# Patient Record
Sex: FEMALE | Race: WHITE | NOT HISPANIC OR LATINO | Employment: FULL TIME | URBAN - METROPOLITAN AREA
[De-identification: names, ages, dates, MRNs, and addresses within clinical notes are randomized per-mention and may not be internally consistent; named-entity substitution may affect disease eponyms.]

---

## 2023-05-19 ENCOUNTER — APPOINTMENT (OUTPATIENT)
Dept: PRIMARY CARE | Facility: CLINIC | Age: 38
End: 2023-05-19
Payer: COMMERCIAL

## 2023-05-23 ENCOUNTER — OFFICE VISIT (OUTPATIENT)
Dept: PRIMARY CARE | Facility: CLINIC | Age: 38
End: 2023-05-23
Payer: COMMERCIAL

## 2023-05-23 VITALS
HEART RATE: 72 BPM | RESPIRATION RATE: 16 BRPM | DIASTOLIC BLOOD PRESSURE: 72 MMHG | WEIGHT: 190 LBS | BODY MASS INDEX: 31.62 KG/M2 | SYSTOLIC BLOOD PRESSURE: 116 MMHG

## 2023-05-23 DIAGNOSIS — Z00.00 ANNUAL PHYSICAL EXAM: Primary | ICD-10-CM

## 2023-05-23 PROBLEM — Z30.02 ENCOUNTER FOR COUNSELING AND INSTRUCTION IN NATURAL FAMILY PLANNING TO AVOID PREGNANCY: Status: ACTIVE | Noted: 2023-05-23

## 2023-05-23 PROBLEM — L20.9 ATOPIC DERMATITIS: Status: ACTIVE | Noted: 2023-05-23

## 2023-05-23 PROBLEM — H10.10 ALLERGIC CONJUNCTIVITIS: Status: ACTIVE | Noted: 2023-05-23

## 2023-05-23 PROBLEM — H04.123 DRY EYES, BILATERAL: Status: ACTIVE | Noted: 2023-05-23

## 2023-05-23 PROBLEM — E25.0: Status: ACTIVE | Noted: 2023-05-23

## 2023-05-23 PROBLEM — J45.909 ASTHMA (HHS-HCC): Status: ACTIVE | Noted: 2023-05-23

## 2023-05-23 PROBLEM — H52.10 MYOPIA: Status: ACTIVE | Noted: 2023-05-23

## 2023-05-23 PROCEDURE — 99395 PREV VISIT EST AGE 18-39: CPT | Performed by: INTERNAL MEDICINE

## 2023-05-23 RX ORDER — DROSPIRENONE AND ETHINYL ESTRADIOL 0.03MG-3MG
1 KIT ORAL DAILY
COMMUNITY
Start: 2023-02-09 | End: 2023-05-23 | Stop reason: ALTCHOICE

## 2023-05-23 RX ORDER — CETIRIZINE HYDROCHLORIDE 10 MG/1
10 TABLET ORAL DAILY
COMMUNITY

## 2023-05-23 RX ORDER — AZELASTINE 1 MG/ML
1 SPRAY, METERED NASAL 2 TIMES DAILY
COMMUNITY

## 2023-05-23 RX ORDER — OLOPATADINE HYDROCHLORIDE 1 MG/ML
1 SOLUTION/ DROPS OPHTHALMIC 2 TIMES DAILY
COMMUNITY

## 2023-05-23 RX ORDER — CHOLECALCIFEROL (VITAMIN D3) 25 MCG
25 TABLET ORAL DAILY
COMMUNITY
Start: 2021-02-03

## 2023-05-23 RX ORDER — NORETHINDRONE 0.35 MG/1
1 TABLET ORAL DAILY
COMMUNITY

## 2023-06-10 NOTE — PROGRESS NOTES
"     Gisel Hooker is a 38 y.o. female who presents for No chief complaint on file..     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     #Postpartum  -Had baby in 21,   -Breastfeeding \"a little still\"   -On OCPs (progesterone only)      #Eczema  -topical clobetasol cream PRN   -Last saw derm 10/2020  -Better than previously, intermittent mild symptoms      #Seasonal Allergies   -Taking Zyrtec, nasal spray (azelastine), and eye drops   -Nasal spray      #HM  -PAP/cytology with HPV cotesting  WNL/negative  -Labs: normal 2022; no need to repeat; no lipid panel while breastfeeding   -On Vitamin D      #Social History   -Never smoking, rare alcohol use  -Wears glasses, last seen 2021        Review of Systems           Objective   /72 (BP Location: Right arm, Patient Position: Sitting, BP Cuff Size: Adult)   Pulse 72   Resp 16   Wt 86.2 kg (190 lb)   BMI 31.62 kg/m²      Physical Exam  Constitutional:       Appearance: Normal appearance.   HENT:      Head: Normocephalic and atraumatic.      Right Ear: External ear normal.      Left Ear: External ear normal.      Nose: No rhinorrhea.      Mouth/Throat:      Mouth: Mucous membranes are moist.      Pharynx: Oropharynx is clear. No oropharyngeal exudate or posterior oropharyngeal erythema.   Eyes:      General: No scleral icterus.     Conjunctiva/sclera: Conjunctivae normal.      Pupils: Pupils are equal, round, and reactive to light.   Cardiovascular:      Rate and Rhythm: Normal rate and regular rhythm.      Pulses: Normal pulses.      Heart sounds: Normal heart sounds. No murmur heard.     No friction rub. No gallop.   Pulmonary:      Effort: Pulmonary effort is normal. No respiratory distress.      Breath sounds: No stridor. No wheezing, rhonchi or rales.   Abdominal:      General: Abdomen is flat. Bowel sounds are normal. There is no distension.      Palpations: Abdomen is soft.      " Tenderness: There is no abdominal tenderness. There is no guarding or rebound.   Musculoskeletal:         General: No swelling or deformity. Normal range of motion.      Cervical back: Normal range of motion and neck supple. No rigidity.      Right lower leg: No edema.      Left lower leg: No edema.   Lymphadenopathy:      Cervical: No cervical adenopathy.   Skin:     General: Skin is warm and dry.      Capillary Refill: Capillary refill takes less than 2 seconds.      Findings: No lesion or rash.   Neurological:      General: No focal deficit present.      Mental Status: She is alert and oriented to person, place, and time. Mental status is at baseline.      Cranial Nerves: No cranial nerve deficit.      Motor: No weakness.      Gait: Gait normal.   Psychiatric:         Mood and Affect: Mood normal.         Behavior: Behavior normal.                  Assessment/Plan   Diagnoses and all orders for this visit:  Annual physical exam  -No medication changes   -No need for labs; will get lipid panel after breastfeeding completed   -PAP due 2026     Discussed with Dr. Gurwinder Beck MD   Internal Medicine - Pediatrics PGY 4

## 2023-06-10 NOTE — PROGRESS NOTES
"     Gisel Hooker is a 38 y.o. female who presents for No chief complaint on file..     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     #Postpartum  -Had baby in 21,   -Breastfeeding \"a little still\"   -On OCPs (progesterone only)      #Eczema  -topical clobetasol cream PRN   -Last saw derm 10/2020  -Better than previously, intermittent mild symptoms      #Seasonal Allergies   -Taking Zyrtec, nasal spray (azelastine), and eye drops   -Nasal spray      #HM  -PAP/cytology with HPV cotesting  WNL/negative  -Labs: normal 2022; no need to repeat; no lipid panel while breastfeeding   -On Vitamin D      #Social History   -Never smoking, rare alcohol use  -Wears glasses, last seen 2021        Review of Systems           Objective   /72 (BP Location: Right arm, Patient Position: Sitting, BP Cuff Size: Adult)   Pulse 72   Resp 16   Wt 86.2 kg (190 lb)   BMI 31.62 kg/m²      Physical Exam  Constitutional:       Appearance: Normal appearance.   HENT:      Head: Normocephalic and atraumatic.      Right Ear: External ear normal.      Left Ear: External ear normal.      Nose: No rhinorrhea.      Mouth/Throat:      Mouth: Mucous membranes are moist.      Pharynx: Oropharynx is clear. No oropharyngeal exudate or posterior oropharyngeal erythema.   Eyes:      General: No scleral icterus.     Conjunctiva/sclera: Conjunctivae normal.      Pupils: Pupils are equal, round, and reactive to light.   Cardiovascular:      Rate and Rhythm: Normal rate and regular rhythm.      Pulses: Normal pulses.      Heart sounds: Normal heart sounds. No murmur heard.     No friction rub. No gallop.   Pulmonary:      Effort: Pulmonary effort is normal. No respiratory distress.      Breath sounds: No stridor. No wheezing, rhonchi or rales.   Abdominal:      General: Abdomen is flat. Bowel sounds are normal. There is no distension.      Palpations: Abdomen is soft.      " Tenderness: There is no abdominal tenderness. There is no guarding or rebound.   Musculoskeletal:         General: No swelling or deformity. Normal range of motion.      Cervical back: Normal range of motion and neck supple. No rigidity.      Right lower leg: No edema.      Left lower leg: No edema.   Lymphadenopathy:      Cervical: No cervical adenopathy.   Skin:     General: Skin is warm and dry.      Capillary Refill: Capillary refill takes less than 2 seconds.      Findings: No lesion or rash.   Neurological:      General: No focal deficit present.      Mental Status: She is alert and oriented to person, place, and time. Mental status is at baseline.      Cranial Nerves: No cranial nerve deficit.      Motor: No weakness.      Gait: Gait normal.   Psychiatric:         Mood and Affect: Mood normal.         Behavior: Behavior normal.                  Assessment/Plan   Diagnoses and all orders for this visit:  Annual physical exam  -No medication changes   -No need for labs; will get lipid panel after breastfeeding completed   -PAP due 2026     Discussed with Dr. Gurwinder Beck MD   Internal Medicine - Pediatrics PGY 4

## 2023-06-20 ENCOUNTER — LAB (OUTPATIENT)
Dept: LAB | Facility: LAB | Age: 38
End: 2023-06-20
Payer: COMMERCIAL

## 2023-06-20 DIAGNOSIS — J06.9 VIRAL UPPER RESPIRATORY INFECTION: Primary | ICD-10-CM

## 2023-06-20 DIAGNOSIS — J06.9 VIRAL UPPER RESPIRATORY INFECTION: ICD-10-CM

## 2023-06-20 PROCEDURE — 87635 SARS-COV-2 COVID-19 AMP PRB: CPT

## 2023-06-21 LAB — SARS-COV-2 RESULT: NOT DETECTED

## 2023-09-10 PROBLEM — D22.21 MELANOCYTIC NEVI OF RIGHT EAR AND EXTERNAL AURICULAR CANAL: Status: ACTIVE | Noted: 2023-06-23

## 2023-09-10 PROBLEM — N91.2 AMENORRHEA: Status: ACTIVE | Noted: 2023-09-10

## 2023-09-10 PROBLEM — D22.62 MELANOCYTIC NEVI OF LEFT UPPER LIMB, INCLUDING SHOULDER: Status: ACTIVE | Noted: 2023-06-23

## 2023-09-10 PROBLEM — H53.9 VISUAL DISTURBANCE: Status: ACTIVE | Noted: 2023-09-10

## 2023-09-10 PROBLEM — N91.5 OLIGOMENORRHEA: Status: ACTIVE | Noted: 2023-09-10

## 2023-09-10 PROBLEM — D22.4 MELANOCYTIC NEVI OF SCALP AND NECK: Status: ACTIVE | Noted: 2023-06-23

## 2023-09-10 PROBLEM — H52.209 ASTIGMATISM: Status: ACTIVE | Noted: 2023-09-10

## 2023-09-10 PROBLEM — I78.1 NEVUS, NON-NEOPLASTIC: Status: ACTIVE | Noted: 2023-06-23

## 2023-09-10 PROBLEM — D22.70 MELANOCYTIC NEVI OF UNSPECIFIED LOWER LIMB, INCLUDING HIP: Status: ACTIVE | Noted: 2023-06-23

## 2023-09-10 PROBLEM — D22.60 MELANOCYTIC NEVI OF UNSPECIFIED UPPER LIMB, INCLUDING SHOULDER: Status: ACTIVE | Noted: 2023-06-23

## 2023-09-10 PROBLEM — L85.8 OTHER SPECIFIED EPIDERMAL THICKENING: Status: ACTIVE | Noted: 2023-06-23

## 2023-09-10 PROBLEM — H43.393 VITREOUS FLOATERS OF BOTH EYES: Status: ACTIVE | Noted: 2023-09-10

## 2023-09-10 PROBLEM — D22.39 MELANOCYTIC NEVI OF OTHER PARTS OF FACE: Status: ACTIVE | Noted: 2023-06-23

## 2023-09-10 PROBLEM — D22.5 MELANOCYTIC NEVI OF TRUNK: Status: ACTIVE | Noted: 2023-06-23

## 2023-09-10 PROBLEM — D22.30 MELANOCYTIC NEVI OF UNSPECIFIED PART OF FACE: Status: ACTIVE | Noted: 2023-06-23

## 2023-09-10 PROBLEM — N97.0 ANOVULATION: Status: ACTIVE | Noted: 2023-09-10

## 2023-09-10 PROBLEM — L81.4 OTHER MELANIN HYPERPIGMENTATION: Status: ACTIVE | Noted: 2023-06-23

## 2023-09-10 PROBLEM — D22.61 MELANOCYTIC NEVI OF RIGHT UPPER LIMB, INCLUDING SHOULDER: Status: ACTIVE | Noted: 2023-06-23

## 2023-09-10 PROBLEM — D22.72 MELANOCYTIC NEVI OF LEFT LOWER LIMB, INCLUDING HIP: Status: ACTIVE | Noted: 2023-06-23

## 2023-09-10 PROBLEM — D22.71 MELANOCYTIC NEVI OF RIGHT LOWER LIMB, INCLUDING HIP: Status: ACTIVE | Noted: 2023-06-23

## 2023-09-10 RX ORDER — CLOBETASOL PROPIONATE 0.5 MG/G
1 CREAM TOPICAL
COMMUNITY
Start: 2016-05-17

## 2023-09-10 RX ORDER — AMMONIUM LACTATE 5 %
1 LOTION (GRAM) TOPICAL
COMMUNITY
Start: 2019-10-18

## 2023-09-10 RX ORDER — DROSPIRENONE AND ETHINYL ESTRADIOL 0.03MG-3MG
1 KIT ORAL DAILY
COMMUNITY
End: 2024-03-21 | Stop reason: SDUPTHER

## 2023-10-11 ASSESSMENT — EXTERNAL EXAM - RIGHT EYE: OD_EXAM: NORMAL

## 2023-10-11 ASSESSMENT — SLIT LAMP EXAM - LIDS
COMMENTS: GOOD POSITION
COMMENTS: GOOD POSITION

## 2023-10-11 ASSESSMENT — EXTERNAL EXAM - LEFT EYE: OS_EXAM: NORMAL

## 2023-10-11 ASSESSMENT — CUP TO DISC RATIO
OD_RATIO: 0.2
OS_RATIO: 0.15

## 2023-10-11 NOTE — PROGRESS NOTES
EzraikM34.10  TscdqunjvqvC42.209  -New Rx given for glasses (but optional to fill since wears CL`s primarily)  -New Rx for CL`s given - no overnight wear and replace q2 weeks  -In 2020 tried trial of decreasing sph and increasing cyl and patient did not like this change as much. Tolerating current Rx well without difficulty with reading - will continue current Rx for now.  OD: -7.00 - 1.75 x 100  OS: -7.00 - 1.75 x 70  **11/29/23 update: AV Oasys for Astigmatism has been discontinued in patient's Rx. Will try Acuvue Marce for Astigmatism 8.6/14.5 in same Rx (monthly disposable lens) - will order trial lenses and can give final Rx if patient likes new lenses. If not, can consider Air Optix for Astigmatism or daily disposable toric.     Vitreous floaters of both eyesH43.393  -History of floaters. Relatively asymptomatic now. No retinal tear or detachment seen on exam. Retinal detachment symptoms discussed.     Dry eyes, fwhblckpfS08.123  -Works on computer all day. Advised to try blinking with blurring or use CL rewetting gtts. Can also add warm compresses as needed.   -History of spring time allergies, has tried zaditor in the past without much relief. Next time can consider using Azelastine --> patient to send me message if she wants a prescription sent.      Family history of macular hsoqwrpymuhdW40.518  -(+)FH AMD: PGF  -Normal macular exam today. Monitor annually.    No history of intraocular surgery/refractive surgery.   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 No further episodes. Monitor.

## 2023-10-13 ENCOUNTER — OFFICE VISIT (OUTPATIENT)
Dept: OPHTHALMOLOGY | Facility: CLINIC | Age: 38
End: 2023-10-13
Payer: COMMERCIAL

## 2023-10-13 DIAGNOSIS — H04.123 DRY EYES, BILATERAL: ICD-10-CM

## 2023-10-13 DIAGNOSIS — Z83.518 FAMILY HISTORY OF MACULAR DEGENERATION: ICD-10-CM

## 2023-10-13 DIAGNOSIS — H52.203 ASTIGMATISM OF BOTH EYES, UNSPECIFIED TYPE: ICD-10-CM

## 2023-10-13 DIAGNOSIS — H52.13 MYOPIA OF BOTH EYES: Primary | ICD-10-CM

## 2023-10-13 DIAGNOSIS — H43.393 VITREOUS FLOATERS OF BOTH EYES: ICD-10-CM

## 2023-10-13 PROCEDURE — 92015 DETERMINE REFRACTIVE STATE: CPT | Performed by: OPHTHALMOLOGY

## 2023-10-13 PROCEDURE — 1036F TOBACCO NON-USER: CPT | Performed by: OPHTHALMOLOGY

## 2023-10-13 PROCEDURE — 92014 COMPRE OPH EXAM EST PT 1/>: CPT | Performed by: OPHTHALMOLOGY

## 2023-10-13 ASSESSMENT — VISUAL ACUITY
VA_OR_OS_CURRENT_RX: 20/20
OS_CC: 20/20
CORRECTION_TYPE: CONTACTS
OS_CC: 20/20
OD_CC: 20/20
CORRECTION_TYPE: GLASSES
METHOD: SNELLEN - LINEAR
OD_CC: 20/20
VA_OR_OD_CURRENT_RX: 20/20
METHOD: SNELLEN - LINEAR

## 2023-10-13 ASSESSMENT — REFRACTION_CURRENTRX
OS_CYLINDER: -1.75
OD_AXIS: 100
OS_DIAMETER: 14.5
OS_BASECURVE: 8.6
OD_CYLINDER: -1.75
OD_BASECURVE: 8.6
OD_SPHERE: -7.00
OS_AXIS: 070
OD_DIAMETER: 14.5
OD_BRAND: ACUVUE OASYS ASTIGMATISM
OS_SPHERE: -7.00

## 2023-10-13 ASSESSMENT — REFRACTION_WEARINGRX
OS_SPHERE: -7.50
OD_AXIS: 100
OS_CYLINDER: -3.00
OD_SPHERE: -7.50
OD_CYLINDER: -3.00
OS_AXIS: 070

## 2023-10-13 ASSESSMENT — ENCOUNTER SYMPTOMS
EYES NEGATIVE: 0
GASTROINTESTINAL NEGATIVE: 0
CARDIOVASCULAR NEGATIVE: 0
ALLERGIC/IMMUNOLOGIC NEGATIVE: 0
PSYCHIATRIC NEGATIVE: 0
RESPIRATORY NEGATIVE: 0
NEUROLOGICAL NEGATIVE: 0
MUSCULOSKELETAL NEGATIVE: 0
HEMATOLOGIC/LYMPHATIC NEGATIVE: 0
ENDOCRINE NEGATIVE: 0
CONSTITUTIONAL NEGATIVE: 0

## 2023-10-13 ASSESSMENT — REFRACTION_MANIFEST
OS_AXIS: 070
OD_CYLINDER: -3.00
OS_SPHERE: -7.50
OD_SPHERE: -7.50
OD_AXIS: 100
OS_CYLINDER: -3.00

## 2023-10-13 ASSESSMENT — TONOMETRY
IOP_METHOD: GOLDMANN APPLANATION
OS_IOP_MMHG: 16
OD_IOP_MMHG: 15

## 2023-11-22 ENCOUNTER — PATIENT MESSAGE (OUTPATIENT)
Dept: OPHTHALMOLOGY | Facility: CLINIC | Age: 38
End: 2023-11-22
Payer: COMMERCIAL

## 2023-12-06 ENCOUNTER — ANCILLARY PROCEDURE (OUTPATIENT)
Dept: RADIOLOGY | Facility: CLINIC | Age: 38
End: 2023-12-06
Payer: COMMERCIAL

## 2023-12-06 ENCOUNTER — OFFICE VISIT (OUTPATIENT)
Dept: PRIMARY CARE | Facility: CLINIC | Age: 38
End: 2023-12-06
Payer: COMMERCIAL

## 2023-12-06 DIAGNOSIS — R05.3 CHRONIC COUGH: ICD-10-CM

## 2023-12-06 DIAGNOSIS — J45.998 POST VIRAL RAD (REACTIVE AIRWAY DISEASE) (HHS-HCC): ICD-10-CM

## 2023-12-06 DIAGNOSIS — J45.998 POST VIRAL RAD (REACTIVE AIRWAY DISEASE) (HHS-HCC): Primary | ICD-10-CM

## 2023-12-06 PROCEDURE — 71046 X-RAY EXAM CHEST 2 VIEWS: CPT

## 2023-12-06 PROCEDURE — 1036F TOBACCO NON-USER: CPT | Performed by: INTERNAL MEDICINE

## 2023-12-06 PROCEDURE — 99214 OFFICE O/P EST MOD 30 MIN: CPT | Performed by: INTERNAL MEDICINE

## 2023-12-06 PROCEDURE — 71046 X-RAY EXAM CHEST 2 VIEWS: CPT | Performed by: STUDENT IN AN ORGANIZED HEALTH CARE EDUCATION/TRAINING PROGRAM

## 2023-12-06 RX ORDER — FLUTICASONE PROPIONATE AND SALMETEROL 250; 50 UG/1; UG/1
1 POWDER RESPIRATORY (INHALATION)
Qty: 60 EACH | Refills: 0 | Status: SHIPPED | OUTPATIENT
Start: 2023-12-06 | End: 2024-12-05

## 2023-12-06 RX ORDER — ALBUTEROL SULFATE 90 UG/1
2 AEROSOL, METERED RESPIRATORY (INHALATION) EVERY 4 HOURS PRN
Qty: 18 G | Refills: 0 | Status: SHIPPED | OUTPATIENT
Start: 2023-12-06 | End: 2024-12-05

## 2023-12-06 NOTE — PROGRESS NOTES
Gisel Hooker is a 39 yo F presenting for sick visit.   CPE 5.23.24.       *Cough x 4 wks   -started with a URI, in fact two URIs back to back   -persistent cough   -cough somewhat productive sputum       1. 21 hydroxylase deficiency     2. Eczema     3. AR   -zyrtec   -azelastine     #HM   -pap 2021   -screen labs 4.22      ROS   Gen neg fever neg chills neg fatigue   Pulm pos cough pos sputum       O   Gen Aox3 NAD well appearing   HEENT mmm pharynx clear no cervical LAD   Eyes sclerae clear   CV rrr nl s1/2 no m/r/g  Pulm CTAB no adventitious sounds   Ext warm dry no edema 2+ DP pulse         A/P   Suspect post-infectious RAD   -start advair bid   -albuterol prn   -cxr r/o cap   -if not resolved in 2 wks call back

## 2023-12-06 NOTE — PATIENT INSTRUCTIONS
Gisel  -       Take generic Advair 1 puff twice a day until cough FULLY resolved.     Albuterol as needed.     Chest x-ray today - if any findings, will start antibiotics - I will message via [employer] either way.       CL

## 2024-02-15 ENCOUNTER — APPOINTMENT (OUTPATIENT)
Dept: OBSTETRICS AND GYNECOLOGY | Facility: CLINIC | Age: 39
End: 2024-02-15
Payer: COMMERCIAL

## 2024-03-21 ENCOUNTER — OFFICE VISIT (OUTPATIENT)
Dept: OBSTETRICS AND GYNECOLOGY | Facility: CLINIC | Age: 39
End: 2024-03-21
Payer: COMMERCIAL

## 2024-03-21 VITALS
SYSTOLIC BLOOD PRESSURE: 114 MMHG | WEIGHT: 188 LBS | BODY MASS INDEX: 31.32 KG/M2 | DIASTOLIC BLOOD PRESSURE: 90 MMHG | HEIGHT: 65 IN

## 2024-03-21 DIAGNOSIS — Z01.419 WELL WOMAN EXAM WITH ROUTINE GYNECOLOGICAL EXAM: Primary | ICD-10-CM

## 2024-03-21 DIAGNOSIS — N91.4 SECONDARY OLIGOMENORRHEA: ICD-10-CM

## 2024-03-21 PROCEDURE — 1036F TOBACCO NON-USER: CPT | Performed by: OBSTETRICS & GYNECOLOGY

## 2024-03-21 PROCEDURE — 99395 PREV VISIT EST AGE 18-39: CPT | Performed by: OBSTETRICS & GYNECOLOGY

## 2024-03-21 RX ORDER — DROSPIRENONE AND ETHINYL ESTRADIOL 0.03MG-3MG
1 KIT ORAL DAILY
Qty: 84 TABLET | Refills: 3 | Status: SHIPPED | OUTPATIENT
Start: 2024-03-21 | End: 2024-03-25

## 2024-03-21 NOTE — PROGRESS NOTES
Subjective   Patient ID: Gisel Hooker is a 38 y.o. female who presents for Annual Exam (Last PAP= 5/2021 negative //Skyla. GAL OZUNA II/).  Pap due 2026.   Menses regular on OCPs.      Review of Systems   All other systems reviewed and are negative.    Objective   Physical Exam  Vitals reviewed.   Constitutional:       Appearance: Normal appearance.   HENT:      Head: Normocephalic and atraumatic.      Right Ear: External ear normal.      Left Ear: External ear normal.      Nose: Nose normal.      Mouth/Throat:      Mouth: Mucous membranes are moist.   Eyes:      Extraocular Movements: Extraocular movements intact.   Neck:      Thyroid: No thyroid mass or thyromegaly.   Cardiovascular:      Rate and Rhythm: Normal rate and regular rhythm.      Heart sounds: Normal heart sounds.   Pulmonary:      Effort: Pulmonary effort is normal.      Breath sounds: Normal breath sounds.   Chest:   Breasts:     Right: Normal.      Left: Normal.   Abdominal:      General: Abdomen is flat. Bowel sounds are normal.      Palpations: Abdomen is soft.      Tenderness: There is no abdominal tenderness. There is no right CVA tenderness or left CVA tenderness.   Genitourinary:     General: Normal vulva.      Exam position: Lithotomy position.      Labia:         Right: No lesion.         Left: No lesion.       Urethra: No prolapse, urethral swelling or urethral lesion.      Vagina: Normal.      Cervix: Normal.      Uterus: Normal.       Adnexa: Right adnexa normal and left adnexa normal.   Musculoskeletal:         General: Normal range of motion.      Right shoulder: Normal.      Left shoulder: Normal.      Cervical back: Normal, normal range of motion and neck supple.      Thoracic back: Normal.      Lumbar back: Normal.      Right hip: Normal.      Left hip: Normal.      Right upper leg: Normal.      Left upper leg: Normal.      Right knee: Normal.      Left knee: Normal.      Right lower leg: Normal.      Left lower leg: Normal.    Lymphadenopathy:      Upper Body:      Right upper body: No axillary adenopathy.      Left upper body: No axillary adenopathy.      Lower Body: No right inguinal adenopathy. No left inguinal adenopathy.   Skin:     General: Skin is warm and dry.   Neurological:      General: No focal deficit present.      Mental Status: She is alert and oriented to person, place, and time.      Cranial Nerves: Cranial nerves 2-12 are intact.      Motor: Motor function is intact.   Psychiatric:         Attention and Perception: Attention and perception normal.         Mood and Affect: Mood and affect normal.         Behavior: Behavior normal.         Cognition and Memory: Cognition normal.         Judgment: Judgment normal.     Assessment/Plan   Problem List Items Addressed This Visit             ICD-10-CM    Oligomenorrhea N91.5    Relevant Medications    drospirenone-ethinyl estradioL (Isadora, Ocella) 3-0.03 mg tablet     Other Visit Diagnoses         Codes    Well woman exam with routine gynecological exam    -  Primary Z01.419               Dafne Argueta MD 03/21/24 8:56 AM

## 2024-03-25 RX ORDER — DROSPIRENONE AND ETHINYL ESTRADIOL 0.03MG-3MG
1 KIT ORAL DAILY
Qty: 84 TABLET | Refills: 3 | Status: SHIPPED | OUTPATIENT
Start: 2024-03-25

## 2024-05-24 ENCOUNTER — APPOINTMENT (OUTPATIENT)
Dept: PRIMARY CARE | Facility: CLINIC | Age: 39
End: 2024-05-24
Payer: COMMERCIAL

## 2024-07-02 ENCOUNTER — APPOINTMENT (OUTPATIENT)
Dept: PRIMARY CARE | Facility: CLINIC | Age: 39
End: 2024-07-02
Payer: COMMERCIAL

## 2024-08-30 ENCOUNTER — APPOINTMENT (OUTPATIENT)
Dept: PRIMARY CARE | Facility: CLINIC | Age: 39
End: 2024-08-30
Payer: COMMERCIAL

## 2024-08-30 VITALS
HEART RATE: 111 BPM | SYSTOLIC BLOOD PRESSURE: 154 MMHG | DIASTOLIC BLOOD PRESSURE: 80 MMHG | BODY MASS INDEX: 31.62 KG/M2 | WEIGHT: 190 LBS | OXYGEN SATURATION: 98 %

## 2024-08-30 DIAGNOSIS — Z00.00 HEALTH CARE MAINTENANCE: Primary | ICD-10-CM

## 2024-08-30 DIAGNOSIS — Z83.2 FAMILY HISTORY OF AUTOIMMUNE DISORDER: ICD-10-CM

## 2024-08-30 PROCEDURE — 99395 PREV VISIT EST AGE 18-39: CPT | Performed by: STUDENT IN AN ORGANIZED HEALTH CARE EDUCATION/TRAINING PROGRAM

## 2024-08-30 NOTE — PROGRESS NOTES
Subjective   Patient ID: Gisel Hooker is a 39 y.o. female who presents for Establish Care./Annual physical  HPI  Patient is here to establish care/annual physical.  No concerns endorsed.  Former  patient of Dr. Purdy  History of 21-hydroxylase deficiency, allergic rhinitis.      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, nonclassical heterozygous    Dry eyes     Encounter for full-term uncomplicated delivery ([facility]) 2017     (spontaneous vaginal delivery)    Myopia of both eyes     Oligomenorrhea, unspecified 2016    Oligomenorrhea    Personal history of other benign neoplasm 2016    History of dysplastic nevus    Personal history of other diseases of urinary system 2016    History of simple renal cyst    Polycystic ovarian syndrome     PCOS (polycystic ovarian syndrome)    PVD (posterior vitreous detachment), both eyes     Varicella without complication 2016    Varicella without complication      Past Surgical History:   Procedure Laterality Date    OTHER SURGICAL HISTORY  2016    Dental Surgery      Family History   Problem Relation Name Age of Onset    Multiple sclerosis Mother      Hashimoto's thyroiditis Father      Prostate cancer Father      Raynaud syndrome Father      Hashimoto's thyroiditis Father's Sister      Alzheimer's disease Maternal Grandmother      Breast cancer Paternal Grandmother      Hashimoto's thyroiditis Paternal Grandmother      Leukemia Paternal Grandmother      Cancer Paternal Grandmother          of other organs or systems    Skin cancer Paternal Grandmother      Other (age related macular degeneration) Paternal Grandfather      Alzheimer's disease Paternal Grandfather      Cancer Paternal Grandfather          of other organs or systems    Prostate cancer Paternal Grandfather      Stomach cancer Paternal Grandfather        No Known  Allergies       Occupation:     Review of Systems   Constitutional:  Negative for activity change and fever.   HENT:  Negative for congestion.    Respiratory:  Negative for cough, shortness of breath and wheezing.    Cardiovascular:  Negative for chest pain and leg swelling.   Gastrointestinal:  Negative for abdominal pain, constipation, nausea and vomiting.   Endocrine: Negative for cold intolerance.   Genitourinary:  Negative for dysuria, hematuria and urgency.   Neurological:  Negative for dizziness, speech difficulty, weakness and numbness.   Psychiatric/Behavioral:  Negative for self-injury and suicidal ideas.        Objective   Visit Vitals  /80   Pulse (!) 111   Wt 86.2 kg (190 lb)   SpO2 98%   BMI 31.62 kg/m²   OB Status Having periods   Smoking Status Never   BSA 1.99 m²      Physical Exam  Constitutional:       Appearance: Normal appearance.   HENT:      Head: Normocephalic and atraumatic.      Nose: Nose normal.      Mouth/Throat:      Mouth: Mucous membranes are moist.   Eyes:      Conjunctiva/sclera: Conjunctivae normal.      Pupils: Pupils are equal, round, and reactive to light.   Cardiovascular:      Rate and Rhythm: Normal rate and regular rhythm.      Pulses: Normal pulses.      Heart sounds: Normal heart sounds.   Pulmonary:      Effort: Pulmonary effort is normal.      Breath sounds: Normal breath sounds.   Musculoskeletal:         General: Normal range of motion.      Cervical back: Neck supple.   Skin:     General: Skin is warm.   Neurological:      General: No focal deficit present.      Mental Status: She is alert and oriented to person, place, and time.   Psychiatric:         Mood and Affect: Mood normal.         Behavior: Behavior normal.         Thought Content: Thought content normal.         Judgment: Judgment normal.         Assessment/Plan   Diagnoses and all orders for this visit:  Health care maintenance  Pap smear done in 2021.  Up-to-date on Tdap vaccine     Hepatitis B  Surface Antigen; Future  -     Hepatitis B Surface Antibody; Future  -     Hemoglobin A1C; Future  -     CBC; Future  -     Lipid Panel; Future  -     Comprehensive Metabolic Panel; Future  -     TSH with reflex to Free T4 if abnormal; Future  -     FABIÁN with Reflex to ARI; Future  -     Hepatitis A antibodies, total; Future  Family history of autoimmune disorder  -     FABIÁN with Reflex to ARI; Future

## 2024-09-13 ENCOUNTER — APPOINTMENT (OUTPATIENT)
Dept: DERMATOLOGY | Facility: CLINIC | Age: 39
End: 2024-09-13
Payer: COMMERCIAL

## 2024-09-13 DIAGNOSIS — D22.9 MULTIPLE BENIGN MELANOCYTIC NEVI: ICD-10-CM

## 2024-09-13 DIAGNOSIS — Z12.83 SCREENING EXAM FOR SKIN CANCER: ICD-10-CM

## 2024-09-13 DIAGNOSIS — D18.01 CHERRY ANGIOMA: ICD-10-CM

## 2024-09-13 DIAGNOSIS — L20.9 ATOPIC DERMATITIS, UNSPECIFIED TYPE: Primary | ICD-10-CM

## 2024-09-13 PROCEDURE — 99213 OFFICE O/P EST LOW 20 MIN: CPT | Performed by: STUDENT IN AN ORGANIZED HEALTH CARE EDUCATION/TRAINING PROGRAM

## 2024-09-13 RX ORDER — CLOBETASOL PROPIONATE 0.5 MG/G
OINTMENT TOPICAL
Qty: 30 G | Refills: 3 | Status: CANCELLED | OUTPATIENT
Start: 2024-09-13

## 2024-09-13 ASSESSMENT — ENCOUNTER SYMPTOMS
NUMBNESS: 0
SHORTNESS OF BREATH: 0
WHEEZING: 0
ABDOMINAL PAIN: 0
HEMATURIA: 0
VOMITING: 0
FEVER: 0
DIZZINESS: 0
CONSTIPATION: 0
SPEECH DIFFICULTY: 0
COUGH: 0
WEAKNESS: 0
NAUSEA: 0
ACTIVITY CHANGE: 0
DYSURIA: 0

## 2024-09-13 NOTE — PROGRESS NOTES
Subjective     Gisel Hooker is a 39 y.o. female who presents for the following: Skin Check (FBSE, no areas of concern today. Family HX of skin cancer).     Melanoma: Both paternal Grandparent(s). Basal/Squamous Cell Cancer: Grandparent(s)    Review of Systems:  No other skin or systemic complaints other than what is documented elsewhere in the note.    The following portions of the chart were reviewed this encounter and updated as appropriate:         Skin Cancer History  No skin cancer on file.      Specialty Problems          Dermatology Problems    Atopic dermatitis    Melanocytic nevi of left lower limb, including hip    Melanocytic nevi of left upper limb, including shoulder    Melanocytic nevi of other parts of face    Melanocytic nevi of right ear and external auricular canal    Melanocytic nevi of right lower limb, including hip    Melanocytic nevi of right upper limb, including shoulder    Melanocytic nevi of scalp and neck    Melanocytic nevi of trunk    Melanocytic nevi of unspecified lower limb, including hip    Melanocytic nevi of unspecified part of face    Melanocytic nevi of unspecified upper limb, including shoulder    Nevus, non-neoplastic    Other melanin hyperpigmentation    Other specified epidermal thickening        Objective   Well appearing patient in no apparent distress; mood and affect are within normal limits.    A full examination was performed including scalp, head, eyes, ears, nose, lips, neck, chest, axillae, abdomen, back, buttocks, bilateral upper extremities, bilateral lower extremities, hands, feet, fingers, toes, fingernails, and toenails. All findings within normal limits unless otherwise noted below.    Assessment/Plan   1. Atopic dermatitis, unspecified type  Left Hand - Anterior, Right Hand - Anterior  Erythematous scaly papules and plaques on the hands, clear with clobetasol    # History of Eczema - Patient has several year history of hand dermatitis - Currently using  clobetasol 0.05% topical ointment PRN. Patient declines refills today (has at home). Continue clobetasol 0.05% ointment as needed. The risks, benefits, and side effects of topical steroids, including possible skin atrophy with overuse of topical steroids, were discussed. The patient expressed understanding and is in agreement with this plan.   - Instructed to avoid using hard dish soaps and to apply moisturizer twice daily    2. Multiple benign melanocytic nevi  - scattered regular brown macules and papules    Benign melanocytic nevi  - Discussed benign nature and that no treatment is necessary unless it becomes painful or increases in size. Patient opts for clinical monitoring at this time.    - Sun protective behavior reviewed and encouraged including the use of over-the-counter sunscreen with SPF30+ daily (reapply every 1.5 hours when outdoors), UPF clothing, broad rimmed hats, sunglasses, and avoidance of midday sun. Home skin monitoring encouraged and how to monitor for skin cancer (changing or new moles, new rapidly growing or non-healing lesions) reviewed. Patient encouraged to call with interval concerns or changes.      3. Cherry angioma  - scattered small bright red papules and macules    Cherry angioma(s)  - Discussed benign nature and that no treatment is necessary unless it becomes painful or increases in size. Patient opts for clinical monitoring at this time.      4. Screening exam for skin cancer    Related Procedures  Follow Up In Dermatology - Established Patient      Return to clinic in 12 months for full body skin check.    Oly Lee MD  Department of Dermatology    I was present during all key portions of visit including history, exam, discussion/plan and/or procedures and directly supervised our resident during all portions of the visit, follow up care, medications and more    Charanjit Ford MD

## 2024-10-07 ASSESSMENT — REFRACTION_CURRENTRX
OD_BRAND: ACUVUE VITA FOR ASTIGMATISM
OS_BASECURVE: 8.6
OS_CYLINDER: -1.75
OS_AXIS: 070
OS_BRAND: ACUVUE VITA FOR ASTIGMATISM
OD_DIAMETER: 14.5
OS_DIAMETER: 14.5
OD_AXIS: 100
OD_SPHERE: -7.00
OS_SPHERE: -7.00
OD_BASECURVE: 8.6
OD_CYLINDER: -1.75

## 2024-10-07 ASSESSMENT — EXTERNAL EXAM - RIGHT EYE: OD_EXAM: NORMAL

## 2024-10-07 ASSESSMENT — CUP TO DISC RATIO
OS_RATIO: 0.15
OD_RATIO: 0.2

## 2024-10-07 ASSESSMENT — EXTERNAL EXAM - LEFT EYE: OS_EXAM: NORMAL

## 2024-10-07 ASSESSMENT — SLIT LAMP EXAM - LIDS
COMMENTS: GOOD POSITION
COMMENTS: GOOD POSITION

## 2024-10-07 NOTE — PROGRESS NOTES
EvtwnoS28.10  DtjrljadwdeQ23.209  -New Rx given for glasses (but optional to fill since wears CL`s primarily). Had some difficulty getting used to glasses from 2023 exam - reduced cyl OD to be similar to 2018 wearing Rx. But difficulty adjusting likely due to high cyl.   -New Rx for CL`s given - no overnight wear and replace qmonthly (Acuvue Marce for Astigmatism).  -In 2020 tried trial of decreasing sph and increasing cyl and patient did not like this change as much. Tolerating current Rx well without difficulty with reading - will continue current Rx for now.  OD: -7.00 - 1.75 x 100  OS: -7.00 - 1.75 x 70  **11/29/23 update: AV Oasys for Astigmatism has been discontinued in patient's Rx. Switched to Acuvue Marce for Astigmatism 8.6/14.5 in same Rx (monthly disposable lens)  -Patient content with Acuvue Marce for Astigmatism. Can consider Air Optix for Astigmatism or daily disposable toric in the future as needed. Advised to consider using hydrogen peroxide based cleaning solution (discussed needs time to neutralize overnight).     Vitreous floaters of both eyesH43.393  -History of floaters. Relatively asymptomatic now. No retinal tear or detachment seen on exam. Retinal detachment symptoms discussed.     Dry eyes, xoymtrbvnQ98.123  -Works on computer all day. Advised to try blinking with blurring or use CL rewetting gtts. Can also add warm compresses as needed.   -History of spring time allergies, has tried zaditor in the past without much relief. Next time can consider using Azelastine --> patient to send me message if she wants a prescription sent.      Family history of macular inadiuwmxoyhV57.518  -(+)FH AMD: PGF  -Normal macular exam today. Monitor annually.      No history of intraocular surgery/refractive surgery.   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  No further episodes. Monitor.

## 2024-10-11 ENCOUNTER — APPOINTMENT (OUTPATIENT)
Dept: OPHTHALMOLOGY | Facility: CLINIC | Age: 39
End: 2024-10-11
Payer: COMMERCIAL

## 2024-10-11 DIAGNOSIS — H52.203 ASTIGMATISM OF BOTH EYES, UNSPECIFIED TYPE: ICD-10-CM

## 2024-10-11 DIAGNOSIS — H04.123 DRY EYES, BILATERAL: ICD-10-CM

## 2024-10-11 DIAGNOSIS — H52.13 MYOPIA OF BOTH EYES: Primary | ICD-10-CM

## 2024-10-11 DIAGNOSIS — Z83.518 FAMILY HISTORY OF MACULAR DEGENERATION: ICD-10-CM

## 2024-10-11 DIAGNOSIS — H43.393 VITREOUS FLOATERS OF BOTH EYES: ICD-10-CM

## 2024-10-11 PROCEDURE — 92015 DETERMINE REFRACTIVE STATE: CPT | Performed by: OPHTHALMOLOGY

## 2024-10-11 PROCEDURE — 92014 COMPRE OPH EXAM EST PT 1/>: CPT | Performed by: OPHTHALMOLOGY

## 2024-10-11 ASSESSMENT — ENCOUNTER SYMPTOMS
CONSTITUTIONAL NEGATIVE: 0
CARDIOVASCULAR NEGATIVE: 0
MUSCULOSKELETAL NEGATIVE: 0
HEMATOLOGIC/LYMPHATIC NEGATIVE: 0
PSYCHIATRIC NEGATIVE: 0
GASTROINTESTINAL NEGATIVE: 0
ENDOCRINE NEGATIVE: 0
NEUROLOGICAL NEGATIVE: 0
RESPIRATORY NEGATIVE: 0
ALLERGIC/IMMUNOLOGIC NEGATIVE: 0
EYES NEGATIVE: 1

## 2024-10-11 ASSESSMENT — REFRACTION_WEARINGRX
OS_CYLINDER: -3.00
OS_AXIS: 075
OD_AXIS: 100
OD_CYLINDER: -3.00
OS_SPHERE: -7.50
OD_SPHERE: -7.50

## 2024-10-11 ASSESSMENT — VISUAL ACUITY
METHOD: SNELLEN - LINEAR
CORRECTION_TYPE: GLASSES
OS_CC: 20/20
CORRECTION_TYPE: CONTACTS
OS_CC: 20/25+
OD_CC: 20/20
METHOD: SNELLEN - LINEAR
VA_OR_OD_CURRENT_RX: 20/20
VA_OR_OS_CURRENT_RX: 20/20
OD_CC: 20/20

## 2024-10-11 ASSESSMENT — REFRACTION
OD_CYLINDER: -2.75
OS_AXIS: 070
OS_SPHERE: -7.50
OS_CYLINDER: -3.00
OD_SPHERE: -7.50
OD_AXIS: 100

## 2024-10-11 ASSESSMENT — TONOMETRY
OD_IOP_MMHG: 16
IOP_METHOD: GOLDMANN APPLANATION
OS_IOP_MMHG: 17

## 2024-10-11 ASSESSMENT — REFRACTION_MANIFEST
OD_SPHERE: -7.50
OD_CYLINDER: -3.00
OS_AXIS: 070
OS_SPHERE: -7.50
OS_CYLINDER: -3.00
OD_AXIS: 100

## 2024-10-18 ENCOUNTER — APPOINTMENT (OUTPATIENT)
Dept: OPHTHALMOLOGY | Facility: CLINIC | Age: 39
End: 2024-10-18
Payer: COMMERCIAL

## 2024-11-15 ENCOUNTER — TELEPHONE (OUTPATIENT)
Dept: OPHTHALMOLOGY | Facility: CLINIC | Age: 39
End: 2024-11-15
Payer: COMMERCIAL

## 2024-11-15 NOTE — TELEPHONE ENCOUNTER
Called to order contacts.  Wanted to use [employer] insurance which we do not take.  She wanted to see if she could find somewhere to order that takes her insurance.  Nothing ordered at this time.

## 2025-01-13 ENCOUNTER — TELEMEDICINE (OUTPATIENT)
Dept: PRIMARY CARE | Facility: CLINIC | Age: 40
End: 2025-01-13
Payer: COMMERCIAL

## 2025-01-13 ENCOUNTER — TELEPHONE (OUTPATIENT)
Dept: PRIMARY CARE | Facility: CLINIC | Age: 40
End: 2025-01-13

## 2025-01-13 DIAGNOSIS — U07.1 COVID: Primary | ICD-10-CM

## 2025-01-13 PROCEDURE — 99213 OFFICE O/P EST LOW 20 MIN: CPT | Performed by: STUDENT IN AN ORGANIZED HEALTH CARE EDUCATION/TRAINING PROGRAM

## 2025-01-13 ASSESSMENT — ENCOUNTER SYMPTOMS
SHORTNESS OF BREATH: 0
CONSTIPATION: 0
ABDOMINAL PAIN: 0
COUGH: 1
NUMBNESS: 0
WEAKNESS: 0
ACTIVITY CHANGE: 0
NAUSEA: 0
HEMATURIA: 0
VOMITING: 0
FEVER: 0
WHEEZING: 0
DIZZINESS: 0
SPEECH DIFFICULTY: 0
DYSURIA: 0

## 2025-01-13 NOTE — PROGRESS NOTES
Subjective   Patient ID: Gisel Hooker is a 39 y.o. female who presents for covid positive.  HPI  Virtual or Telephone Consent    An interactive audio and video telecommunication system which permits real time communications between the patient (at the originating site) and provider (at the distant site) was utilized to provide this telehealth service.   Verbal consent was requested and obtained from Gisel Hooker on this date, 25 for a telehealth visit.       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, nonclassical heterozygous    Dry eyes     Encounter for full-term uncomplicated delivery 2017     (spontaneous vaginal delivery)    Myopia of both eyes     Oligomenorrhea, unspecified 2016    Oligomenorrhea    Personal history of other benign neoplasm 2016    History of dysplastic nevus    Personal history of other diseases of urinary system 2016    History of simple renal cyst    Polycystic ovarian syndrome     PCOS (polycystic ovarian syndrome)    PVD (posterior vitreous detachment), both eyes     Varicella without complication 2016    Varicella without complication      Past Surgical History:   Procedure Laterality Date    OTHER SURGICAL HISTORY  2016    Dental Surgery      Family History   Problem Relation Name Age of Onset    Multiple sclerosis Mother      Hashimoto's thyroiditis Father      Prostate cancer Father      Raynaud syndrome Father      Hashimoto's thyroiditis Father's Sister      Alzheimer's disease Maternal Grandmother      Breast cancer Paternal Grandmother      Hashimoto's thyroiditis Paternal Grandmother      Leukemia Paternal Grandmother      Cancer Paternal Grandmother          of other organs or systems    Skin cancer Paternal Grandmother       Other (age related macular degeneration) Paternal Grandfather      Alzheimer's disease Paternal Grandfather      Cancer Paternal Grandfather          of other organs or systems    Prostate cancer Paternal Grandfather      Stomach cancer Paternal Grandfather        No Known Allergies       Occupation:     Review of Systems   Constitutional:  Negative for activity change and fever.   HENT:  Positive for congestion.    Respiratory:  Positive for cough. Negative for shortness of breath and wheezing.    Cardiovascular:  Negative for chest pain and leg swelling.   Gastrointestinal:  Negative for abdominal pain, constipation, nausea and vomiting.   Endocrine: Negative for cold intolerance.   Genitourinary:  Negative for dysuria, hematuria and urgency.   Neurological:  Negative for dizziness, speech difficulty, weakness and numbness.   Psychiatric/Behavioral:  Negative for self-injury and suicidal ideas.        Objective   Visit Vitals  OB Status Having periods   Smoking Status Never      Physical Exam  Constitutional:       Comments: Physical examination virtual visit is limited.   HENT:      Head: Normocephalic.      Nose: Nose normal.   Eyes:      Extraocular Movements: Extraocular movements intact.   Pulmonary:      Effort: Pulmonary effort is normal.   Musculoskeletal:      Cervical back: Normal range of motion.   Neurological:      Mental Status: She is alert and oriented to person, place, and time.   Psychiatric:         Mood and Affect: Mood normal.         Behavior: Behavior normal.         Thought Content: Thought content normal.         Judgment: Judgment normal.         Assessment/Plan   Diagnoses and all orders for this visit:  COVID  -     nirmatrelvir-ritonavir (Paxlovid) 300 mg (150 mg x 2)-100 mg tablet therapy pack; Take 3 tablets by mouth 2 times a day for 5 days. Follow the instructions on the package

## 2025-02-14 DIAGNOSIS — N91.4 SECONDARY OLIGOMENORRHEA: ICD-10-CM

## 2025-02-14 RX ORDER — DROSPIRENONE AND ETHINYL ESTRADIOL 0.03MG-3MG
1 KIT ORAL DAILY
Qty: 84 TABLET | Refills: 3 | Status: SHIPPED | OUTPATIENT
Start: 2025-02-14

## 2025-03-12 ENCOUNTER — TELEPHONE (OUTPATIENT)
Dept: PRIMARY CARE | Facility: CLINIC | Age: 40
End: 2025-03-12

## 2025-03-14 ENCOUNTER — APPOINTMENT (OUTPATIENT)
Dept: OBSTETRICS AND GYNECOLOGY | Facility: CLINIC | Age: 40
End: 2025-03-14
Payer: COMMERCIAL

## 2025-03-14 ENCOUNTER — OFFICE VISIT (OUTPATIENT)
Dept: ORTHOPEDIC SURGERY | Facility: HOSPITAL | Age: 40
End: 2025-03-14
Payer: COMMERCIAL

## 2025-03-14 ENCOUNTER — HOSPITAL ENCOUNTER (OUTPATIENT)
Dept: RADIOLOGY | Facility: HOSPITAL | Age: 40
Discharge: HOME | End: 2025-03-14
Payer: COMMERCIAL

## 2025-03-14 VITALS
BODY MASS INDEX: 31.65 KG/M2 | DIASTOLIC BLOOD PRESSURE: 70 MMHG | SYSTOLIC BLOOD PRESSURE: 120 MMHG | HEIGHT: 65 IN | WEIGHT: 190 LBS

## 2025-03-14 DIAGNOSIS — M79.671 RIGHT FOOT PAIN: ICD-10-CM

## 2025-03-14 DIAGNOSIS — N91.4 SECONDARY OLIGOMENORRHEA: ICD-10-CM

## 2025-03-14 DIAGNOSIS — Z01.419 ENCOUNTER FOR ANNUAL ROUTINE GYNECOLOGICAL EXAMINATION: ICD-10-CM

## 2025-03-14 DIAGNOSIS — Z12.31 ENCOUNTER FOR SCREENING MAMMOGRAM FOR MALIGNANT NEOPLASM OF BREAST: Primary | ICD-10-CM

## 2025-03-14 PROCEDURE — 99203 OFFICE O/P NEW LOW 30 MIN: CPT | Performed by: PHYSICIAN ASSISTANT

## 2025-03-14 PROCEDURE — 73630 X-RAY EXAM OF FOOT: CPT | Mod: RT

## 2025-03-14 PROCEDURE — 99395 PREV VISIT EST AGE 18-39: CPT | Performed by: OBSTETRICS & GYNECOLOGY

## 2025-03-14 PROCEDURE — 3008F BODY MASS INDEX DOCD: CPT | Performed by: OBSTETRICS & GYNECOLOGY

## 2025-03-14 PROCEDURE — 99213 OFFICE O/P EST LOW 20 MIN: CPT | Performed by: PHYSICIAN ASSISTANT

## 2025-03-14 PROCEDURE — 1036F TOBACCO NON-USER: CPT | Performed by: OBSTETRICS & GYNECOLOGY

## 2025-03-14 RX ORDER — COVID-19 VACCINE, MRNA 50 UG/.5ML
INJECTION, SUSPENSION INTRAMUSCULAR
COMMUNITY
Start: 2024-09-13 | End: 2025-03-14 | Stop reason: WASHOUT

## 2025-03-14 RX ORDER — DROSPIRENONE AND ETHINYL ESTRADIOL 0.03MG-3MG
1 KIT ORAL DAILY
Qty: 84 TABLET | Refills: 3 | Status: SHIPPED | OUTPATIENT
Start: 2025-03-14

## 2025-03-14 RX ORDER — DROSPIRENONE AND ETHINYL ESTRADIOL 0.03MG-3MG
1 KIT ORAL DAILY
Qty: 84 TABLET | Refills: 3 | Status: SHIPPED | OUTPATIENT
Start: 2025-03-14 | End: 2025-03-14

## 2025-03-14 ASSESSMENT — ENCOUNTER SYMPTOMS
PSYCHIATRIC NEGATIVE: 0
HEMATOLOGIC/LYMPHATIC NEGATIVE: 0
ALLERGIC/IMMUNOLOGIC NEGATIVE: 0
ENDOCRINE NEGATIVE: 0
NEUROLOGICAL NEGATIVE: 0
MUSCULOSKELETAL NEGATIVE: 0
RESPIRATORY NEGATIVE: 0
GASTROINTESTINAL NEGATIVE: 0
CARDIOVASCULAR NEGATIVE: 0
CONSTITUTIONAL NEGATIVE: 0
EYES NEGATIVE: 0

## 2025-03-14 ASSESSMENT — PAIN SCALES - GENERAL: PAINLEVEL_OUTOF10: 0-NO PAIN

## 2025-03-14 NOTE — PROGRESS NOTES
Subjective   Patient ID: Gisel Hooker is a 39 y.o. female who presents for Annual Exam.  39 year old patient, for well woman exam.   Doing ok on her OCPs. No complaints.   Uses a menstrual cup- has in today.     Review of Systems   All other systems reviewed and are negative.    Objective   Physical Exam  Vitals reviewed.   Constitutional:       Appearance: Normal appearance.   HENT:      Head: Normocephalic and atraumatic.      Right Ear: External ear normal.      Left Ear: External ear normal.      Nose: Nose normal.      Mouth/Throat:      Mouth: Mucous membranes are moist.   Eyes:      Extraocular Movements: Extraocular movements intact.   Neck:      Thyroid: No thyroid mass or thyromegaly.   Cardiovascular:      Rate and Rhythm: Normal rate and regular rhythm.      Heart sounds: Normal heart sounds.   Pulmonary:      Effort: Pulmonary effort is normal.      Breath sounds: Normal breath sounds.   Chest:   Breasts:     Right: Normal.      Left: Normal.   Abdominal:      General: Abdomen is flat. Bowel sounds are normal.      Palpations: Abdomen is soft.      Tenderness: There is no abdominal tenderness. There is no right CVA tenderness or left CVA tenderness.   Genitourinary:     General: Normal vulva.      Exam position: Lithotomy position.      Labia:         Right: No lesion.         Left: No lesion.       Urethra: No prolapse, urethral swelling or urethral lesion.      Vagina: Normal.      Cervix: Normal.      Uterus: Normal.       Adnexa: Right adnexa normal and left adnexa normal.   Musculoskeletal:         General: Normal range of motion.      Right shoulder: Normal.      Left shoulder: Normal.      Cervical back: Normal, normal range of motion and neck supple.      Thoracic back: Normal.      Lumbar back: Normal.      Right hip: Normal.      Left hip: Normal.      Right upper leg: Normal.      Left upper leg: Normal.      Right knee: Normal.      Left knee: Normal.      Right lower leg: Normal.       Left lower leg: Normal.   Lymphadenopathy:      Upper Body:      Right upper body: No axillary adenopathy.      Left upper body: No axillary adenopathy.      Lower Body: No right inguinal adenopathy. No left inguinal adenopathy.   Skin:     General: Skin is warm and dry.   Neurological:      General: No focal deficit present.      Mental Status: She is alert and oriented to person, place, and time.      Cranial Nerves: Cranial nerves 2-12 are intact.      Motor: Motor function is intact.   Psychiatric:         Attention and Perception: Attention and perception normal.         Mood and Affect: Mood and affect normal.         Behavior: Behavior normal.         Cognition and Memory: Cognition normal.         Judgment: Judgment normal.       Assessment/Plan   Problem List Items Addressed This Visit             ICD-10-CM    Oligomenorrhea N91.5    Relevant Medications    drospirenone-ethinyl estradioL (Mica) 3-0.03 mg tablet     Other Visit Diagnoses         Codes    Encounter for screening mammogram for malignant neoplasm of breast    -  Primary Z12.31    Relevant Orders    BI mammo bilateral screening tomosynthesis    Encounter for annual routine gynecological examination     Z01.419             Dafne Argueta MD 03/14/25 9:43 AM

## 2025-03-14 NOTE — LETTER
Running program recommendations/ guidelines  Due to injury, if you missed…    Missed < 1wk-no modification of preinjury training  Missed 1-2 wks-decrease 25% from preinjury mileage  Missed 2-3 wks-decrease 50% from preinjury mileage the first week, decrease 25% from preinjury mileage the second week    Missed over one month-start with stationary bike and work up to 30 minutes 3 times a week, then start program below….  -- Week 1: walk 1-2 miles, alternating 1 min fast and 1 min normal pace  -- Week 2: walk 2-3 miles, alternating 1.5 min jog with 1.5 min walk  -- Week 3: if no pain then substitute 10 min jog every other day in lieu of walk/jog, incorporate rest days as needed  -- Week 4: if no pain then substitute 15 min jog every other day in lieu of walk/jog, incorporate rest days as needed  -- Week 5: jog 15 min and alternated with 25 min every other day, incorporate rest days as needed  -- Week 6: jog 20 min and alternate with 30 min every other day, incorporate rest days as needed  -- Week 7: jog 20 min and alternate with 35 min every other day, incorporate rest days as needed  -- Week 8: jog 20 min and alternate with 40 min every other day, incorporate rest days as needed  -- Week 9: resume usual training at preinjury level if pain-free and training errors have been corrected     Pointers:  -- Make sure that your training program incorporates weight training, flexibility, hip and core strengthening and nonimpact activities  -- Make sure that you get adequate nutrition in your diet, hydration and rest  -- Change out your running shoes every 200-300 miles  -- supportive, well fit running shoes  -- Avoid running on consecutive days; make sure to cross train

## 2025-03-17 NOTE — PROGRESS NOTES
HPI:  Gisel Hooker is a 39 y.o. female who presents to the orthopedic injury clinic today for evaluation of R foot pain.     DOI 03/04/25. Patient reports she was out jogging when she noted acute pain over the right 5th metatarsal head. She tells me this was her first time out running in quite some time. She has been resting however the pain persists with weight bearing & walking.     Denies redness, swelling, bruising. No numbness/tingling. No radicular leg symptoms.     ROS:  Reviewed on EMR and patient intake sheet.    PMH/SH:  Reviewed on EMR and patient intake sheet.    Exam:  Physical Exam  Spine Musculoskeletal Exam    Well appearing, NAD  Pleasant & cooperative  BMI 31.62  Full ROM R foot and ankle  R foot/ankle, 5th MT nontender to palpation   No deformity, bruising, redness, swelling  lower extremity sensation intact to light touch  lower extremity motor 5/5 throughout; no focal motor weakness  normal gait & station; no assistive devices     Radiology:     Xrays R foot done in the office today 03/14/25 personally reviewed, along with the accompanying report when available.     FINDINGS:  Right foot, three views      There is no fracture. There is no dislocation. There are no  degenerative changes. There is no lytic or sclerotic lesion. There is  no soft tissue abnormality seen.      IMPRESSION:  Normal radiographs of the right foot      Assessment and Plan:  1. Right foot pain  - XR foot right 3+ views; Future  - Referral to Physical Therapy; Future    I reviewed the Xray images in detail with Gisel today and reassured no acute findings. Recommend rest, ice, and OTC NSAIDs/Tylenol prn for discomfort.     She was provided a Running Program today and encouraged to obtain well fit, supportive running shoes. Referral placed to PT per patient request.     Recommend follow up with podiatry should her symptoms persist.     Patient in agreement to plan of care. Of course Gisel Hooker is welcome to call at  anytime with questions or concerns.     Mervat Jackson PA-C  Department of Orthopaedic Surgery    [address] [place] [address]    Voicemail: [phone]   Appts: [phone]  Fax: [phone]

## 2025-03-27 ENCOUNTER — APPOINTMENT (OUTPATIENT)
Dept: OBSTETRICS AND GYNECOLOGY | Facility: CLINIC | Age: 40
End: 2025-03-27
Payer: COMMERCIAL

## 2025-03-28 ENCOUNTER — APPOINTMENT (OUTPATIENT)
Dept: OBSTETRICS AND GYNECOLOGY | Facility: CLINIC | Age: 40
End: 2025-03-28
Payer: COMMERCIAL

## 2025-04-11 ENCOUNTER — APPOINTMENT (OUTPATIENT)
Dept: OBSTETRICS AND GYNECOLOGY | Facility: CLINIC | Age: 40
End: 2025-04-11
Payer: COMMERCIAL

## 2025-05-05 DIAGNOSIS — J30.9 ALLERGIC RHINITIS, UNSPECIFIED SEASONALITY, UNSPECIFIED TRIGGER: Primary | ICD-10-CM

## 2025-05-05 RX ORDER — AZELASTINE HCL 205.5 UG/1
SPRAY NASAL
COMMUNITY
Start: 2023-04-01 | End: 2025-05-05 | Stop reason: SDUPTHER

## 2025-05-05 RX ORDER — AZELASTINE HCL 205.5 UG/1
2 SPRAY NASAL
Qty: 23 ML | Refills: 3 | Status: SHIPPED | OUTPATIENT
Start: 2025-05-05 | End: 2025-05-09 | Stop reason: SDUPTHER

## 2025-05-09 ENCOUNTER — TELEPHONE (OUTPATIENT)
Dept: PRIMARY CARE | Facility: CLINIC | Age: 40
End: 2025-05-09

## 2025-05-09 DIAGNOSIS — J30.9 ALLERGIC RHINITIS, UNSPECIFIED SEASONALITY, UNSPECIFIED TRIGGER: ICD-10-CM

## 2025-05-09 RX ORDER — AZELASTINE HCL 205.5 UG/1
2 SPRAY NASAL
Qty: 23 ML | Refills: 3 | Status: SHIPPED | OUTPATIENT
Start: 2025-05-09

## 2025-05-12 ENCOUNTER — HOSPITAL ENCOUNTER (OUTPATIENT)
Dept: RADIOLOGY | Facility: CLINIC | Age: 40
Discharge: HOME | End: 2025-05-12
Payer: COMMERCIAL

## 2025-05-12 VITALS — HEIGHT: 65 IN | BODY MASS INDEX: 30.82 KG/M2 | WEIGHT: 185 LBS

## 2025-05-12 DIAGNOSIS — R92.8 ABNORMAL MAMMOGRAM: Primary | ICD-10-CM

## 2025-05-12 DIAGNOSIS — Z12.31 ENCOUNTER FOR SCREENING MAMMOGRAM FOR MALIGNANT NEOPLASM OF BREAST: ICD-10-CM

## 2025-05-12 PROCEDURE — 77067 SCR MAMMO BI INCL CAD: CPT | Performed by: RADIOLOGY

## 2025-05-12 PROCEDURE — 77063 BREAST TOMOSYNTHESIS BI: CPT | Performed by: RADIOLOGY

## 2025-05-12 PROCEDURE — 77067 SCR MAMMO BI INCL CAD: CPT

## 2025-05-14 ENCOUNTER — TELEPHONE (OUTPATIENT)
Dept: OBSTETRICS AND GYNECOLOGY | Facility: HOSPITAL | Age: 40
End: 2025-05-14
Payer: COMMERCIAL

## 2025-05-14 NOTE — TELEPHONE ENCOUNTER
----- Message from Dafne Argueta sent at 5/12/2025  1:37 PM EDT -----  Orders placed for left breast additional imaging. Please let her know  ----- Message -----  From: Interface, Radiology Results In  Sent: 5/12/2025   1:11 PM EDT  To: Dafne Argueta MD

## 2025-05-14 NOTE — TELEPHONE ENCOUNTER
Patient verified name and date of birth at start of call and was aware of her left breast imaging order being placed and has an appt scheduled. Patient had no further questions, comments or concerns at this time.    DARION Elder

## 2025-05-15 ENCOUNTER — HOSPITAL ENCOUNTER (OUTPATIENT)
Dept: RADIOLOGY | Facility: CLINIC | Age: 40
Discharge: HOME | End: 2025-05-15
Payer: COMMERCIAL

## 2025-05-15 DIAGNOSIS — R92.8 ABNORMAL MAMMOGRAM: ICD-10-CM

## 2025-05-15 PROCEDURE — 76642 ULTRASOUND BREAST LIMITED: CPT | Mod: LT

## 2025-05-15 PROCEDURE — 76982 USE 1ST TARGET LESION: CPT

## 2025-05-15 PROCEDURE — 77065 DX MAMMO INCL CAD UNI: CPT | Mod: LT

## 2025-05-20 DIAGNOSIS — R92.8 ABNORMAL MAMMOGRAM: Primary | ICD-10-CM

## 2025-05-29 ENCOUNTER — TELEPHONE (OUTPATIENT)
Dept: OBSTETRICS AND GYNECOLOGY | Facility: CLINIC | Age: 40
End: 2025-05-29
Payer: COMMERCIAL

## 2025-05-29 ENCOUNTER — TELEPHONE (OUTPATIENT)
Dept: OBSTETRICS AND GYNECOLOGY | Facility: HOSPITAL | Age: 40
End: 2025-05-29
Payer: COMMERCIAL

## 2025-05-29 DIAGNOSIS — R92.8 ABNORMAL MAMMOGRAM: Primary | ICD-10-CM

## 2025-05-29 NOTE — TELEPHONE ENCOUNTER
Patient verified name and date of birth at start of call and was notified of corrected mammogram order now in place. Patient is aware that she is able to schedule appt now. Patient verbalized understanding and had no further questions, comments or concerns at this time.    DARION Elder

## 2025-05-29 NOTE — TELEPHONE ENCOUNTER
Patient verified name and date of birth at start of call and was made aware that a new  mammogram order has been sent to Dr. Argueta. Patient had no further questions, comments or concerns at this time.    DARION Elder

## 2025-06-04 ENCOUNTER — TELEPHONE (OUTPATIENT)
Dept: OBSTETRICS AND GYNECOLOGY | Facility: HOSPITAL | Age: 40
End: 2025-06-04
Payer: COMMERCIAL

## 2025-06-04 NOTE — TELEPHONE ENCOUNTER
Patient verified name and date of birth at start of call and was notified of the recommendation to have a 6 month mammogram follow up. Patient states previously entered orders were incorrect and made aware that Dr. Argueta has been sent a new order and once signed she will be able to schedule appropriate appt. Patient verbalized understanding and had no further questions, comments or concerns at this time.    DARION Elder

## 2025-06-04 NOTE — TELEPHONE ENCOUNTER
----- Message from Dafne Argueta sent at 5/15/2025 10:52 AM EDT -----  Follow-up ultrasound in 6 months, but looks like a lymph node.   Order placed.   ----- Message -----  From: Interface, Radiology Results In  Sent: 5/15/2025  10:49 AM EDT  To: Dafne Argueta MD

## 2025-09-03 ENCOUNTER — APPOINTMENT (OUTPATIENT)
Dept: PRIMARY CARE | Facility: CLINIC | Age: 40
End: 2025-09-03
Payer: COMMERCIAL

## 2025-09-15 ENCOUNTER — APPOINTMENT (OUTPATIENT)
Dept: DERMATOLOGY | Facility: CLINIC | Age: 40
End: 2025-09-15
Payer: COMMERCIAL

## 2025-10-14 ENCOUNTER — APPOINTMENT (OUTPATIENT)
Dept: OPHTHALMOLOGY | Facility: CLINIC | Age: 40
End: 2025-10-14
Payer: COMMERCIAL

## 2025-10-28 ENCOUNTER — APPOINTMENT (OUTPATIENT)
Dept: OPHTHALMOLOGY | Facility: CLINIC | Age: 40
End: 2025-10-28
Payer: COMMERCIAL

## 2026-03-20 ENCOUNTER — APPOINTMENT (OUTPATIENT)
Dept: OBSTETRICS AND GYNECOLOGY | Facility: CLINIC | Age: 41
End: 2026-03-20
Payer: COMMERCIAL